# Patient Record
Sex: FEMALE | Race: BLACK OR AFRICAN AMERICAN | Employment: UNEMPLOYED | URBAN - METROPOLITAN AREA
[De-identification: names, ages, dates, MRNs, and addresses within clinical notes are randomized per-mention and may not be internally consistent; named-entity substitution may affect disease eponyms.]

---

## 2020-07-12 ENCOUNTER — HOSPITAL ENCOUNTER (EMERGENCY)
Age: 50
Discharge: HOME | End: 2020-07-13
Attending: EMERGENCY MEDICINE
Payer: SELF-PAY

## 2020-07-12 ENCOUNTER — APPOINTMENT (OUTPATIENT)
Dept: GENERAL RADIOLOGY | Age: 50
End: 2020-07-12
Attending: EMERGENCY MEDICINE
Payer: SELF-PAY

## 2020-07-12 VITALS
HEIGHT: 66 IN | BODY MASS INDEX: 37.77 KG/M2 | RESPIRATION RATE: 20 BRPM | SYSTOLIC BLOOD PRESSURE: 138 MMHG | DIASTOLIC BLOOD PRESSURE: 78 MMHG | TEMPERATURE: 98.6 F | OXYGEN SATURATION: 99 % | HEART RATE: 91 BPM | WEIGHT: 235 LBS

## 2020-07-12 DIAGNOSIS — K29.00 ACUTE GASTRITIS WITHOUT HEMORRHAGE, UNSPECIFIED GASTRITIS TYPE: Primary | ICD-10-CM

## 2020-07-12 DIAGNOSIS — F10.10 ALCOHOL ABUSE: ICD-10-CM

## 2020-07-12 LAB
ALBUMIN SERPL-MCNC: 3.3 G/DL (ref 3.5–5)
ALBUMIN/GLOB SERPL: 0.6 {RATIO} (ref 1.2–3.5)
ALP SERPL-CCNC: 100 U/L (ref 50–136)
ALT SERPL-CCNC: 19 U/L (ref 12–65)
ANION GAP SERPL CALC-SCNC: 10 MMOL/L (ref 7–16)
AST SERPL-CCNC: 23 U/L (ref 15–37)
BASOPHILS # BLD: 0 K/UL (ref 0–0.2)
BASOPHILS NFR BLD: 0 % (ref 0–2)
BILIRUB SERPL-MCNC: 0.7 MG/DL (ref 0.2–1.1)
BUN SERPL-MCNC: 6 MG/DL (ref 6–23)
CALCIUM SERPL-MCNC: 9.2 MG/DL (ref 8.3–10.4)
CHLORIDE SERPL-SCNC: 108 MMOL/L (ref 98–107)
CO2 SERPL-SCNC: 24 MMOL/L (ref 21–32)
CREAT SERPL-MCNC: 0.86 MG/DL (ref 0.6–1)
DIFFERENTIAL METHOD BLD: ABNORMAL
EOSINOPHIL # BLD: 0.1 K/UL (ref 0–0.8)
EOSINOPHIL NFR BLD: 0 % (ref 0.5–7.8)
ERYTHROCYTE [DISTWIDTH] IN BLOOD BY AUTOMATED COUNT: 12.3 % (ref 11.9–14.6)
ETHANOL SERPL-MCNC: <3 MG/DL
GLOBULIN SER CALC-MCNC: 5.7 G/DL (ref 2.3–3.5)
GLUCOSE SERPL-MCNC: 200 MG/DL (ref 65–100)
HCT VFR BLD AUTO: 50.5 % (ref 35.8–46.3)
HGB BLD-MCNC: 16.1 G/DL (ref 11.7–15.4)
IMM GRANULOCYTES # BLD AUTO: 0.1 K/UL (ref 0–0.5)
IMM GRANULOCYTES NFR BLD AUTO: 0 % (ref 0–5)
LIPASE SERPL-CCNC: 55 U/L (ref 73–393)
LYMPHOCYTES # BLD: 2.6 K/UL (ref 0.5–4.6)
LYMPHOCYTES NFR BLD: 21 % (ref 13–44)
MCH RBC QN AUTO: 29.4 PG (ref 26.1–32.9)
MCHC RBC AUTO-ENTMCNC: 31.9 G/DL (ref 31.4–35)
MCV RBC AUTO: 92.2 FL (ref 79.6–97.8)
MONOCYTES # BLD: 0.5 K/UL (ref 0.1–1.3)
MONOCYTES NFR BLD: 4 % (ref 4–12)
NEUTS SEG # BLD: 9.2 K/UL (ref 1.7–8.2)
NEUTS SEG NFR BLD: 74 % (ref 43–78)
NRBC # BLD: 0 K/UL (ref 0–0.2)
PLATELET # BLD AUTO: 347 K/UL (ref 150–450)
PMV BLD AUTO: 10.8 FL (ref 9.4–12.3)
POTASSIUM SERPL-SCNC: 4 MMOL/L (ref 3.5–5.1)
PROT SERPL-MCNC: 9 G/DL (ref 6.3–8.2)
RBC # BLD AUTO: 5.48 M/UL (ref 4.05–5.2)
SODIUM SERPL-SCNC: 142 MMOL/L (ref 136–145)
TROPONIN-HIGH SENSITIVITY: 16.1 PG/ML (ref 0–14)
WBC # BLD AUTO: 12.4 K/UL (ref 4.3–11.1)

## 2020-07-12 PROCEDURE — 83690 ASSAY OF LIPASE: CPT

## 2020-07-12 PROCEDURE — 80053 COMPREHEN METABOLIC PANEL: CPT

## 2020-07-12 PROCEDURE — 74022 RADEX COMPL AQT ABD SERIES: CPT

## 2020-07-12 PROCEDURE — 96375 TX/PRO/DX INJ NEW DRUG ADDON: CPT

## 2020-07-12 PROCEDURE — 85025 COMPLETE CBC W/AUTO DIFF WBC: CPT

## 2020-07-12 PROCEDURE — 93005 ELECTROCARDIOGRAM TRACING: CPT | Performed by: EMERGENCY MEDICINE

## 2020-07-12 PROCEDURE — 74011000250 HC RX REV CODE- 250: Performed by: EMERGENCY MEDICINE

## 2020-07-12 PROCEDURE — 74011250636 HC RX REV CODE- 250/636: Performed by: EMERGENCY MEDICINE

## 2020-07-12 PROCEDURE — 74011250637 HC RX REV CODE- 250/637: Performed by: EMERGENCY MEDICINE

## 2020-07-12 PROCEDURE — 96374 THER/PROPH/DIAG INJ IV PUSH: CPT

## 2020-07-12 PROCEDURE — 84484 ASSAY OF TROPONIN QUANT: CPT

## 2020-07-12 PROCEDURE — 99284 EMERGENCY DEPT VISIT MOD MDM: CPT

## 2020-07-12 PROCEDURE — 80307 DRUG TEST PRSMV CHEM ANLYZR: CPT

## 2020-07-12 RX ORDER — MAG HYDROX/ALUMINUM HYD/SIMETH 200-200-20
30 SUSPENSION, ORAL (FINAL DOSE FORM) ORAL
Status: COMPLETED | OUTPATIENT
Start: 2020-07-12 | End: 2020-07-12

## 2020-07-12 RX ORDER — LIDOCAINE HYDROCHLORIDE 20 MG/ML
10 SOLUTION OROPHARYNGEAL ONCE
Status: COMPLETED | OUTPATIENT
Start: 2020-07-12 | End: 2020-07-12

## 2020-07-12 RX ORDER — ONDANSETRON 2 MG/ML
4 INJECTION INTRAMUSCULAR; INTRAVENOUS
Status: COMPLETED | OUTPATIENT
Start: 2020-07-12 | End: 2020-07-12

## 2020-07-12 RX ORDER — OMEPRAZOLE 40 MG/1
40 CAPSULE, DELAYED RELEASE ORAL DAILY
Qty: 30 CAP | Refills: 0 | Status: SHIPPED | OUTPATIENT
Start: 2020-07-12

## 2020-07-12 RX ORDER — RANITIDINE 150 MG/1
150 TABLET, FILM COATED ORAL 2 TIMES DAILY
Qty: 60 TAB | Refills: 0 | Status: SHIPPED | OUTPATIENT
Start: 2020-07-12

## 2020-07-12 RX ORDER — PROMETHAZINE HYDROCHLORIDE 25 MG/1
25 TABLET ORAL
Qty: 12 TAB | Refills: 0 | Status: SHIPPED | OUTPATIENT
Start: 2020-07-12

## 2020-07-12 RX ORDER — BUTORPHANOL TARTRATE 2 MG/ML
2 INJECTION INTRAMUSCULAR; INTRAVENOUS
Status: COMPLETED | OUTPATIENT
Start: 2020-07-12 | End: 2020-07-12

## 2020-07-12 RX ADMIN — LIDOCAINE HYDROCHLORIDE 10 ML: 20 SOLUTION ORAL; TOPICAL at 22:24

## 2020-07-12 RX ADMIN — ALUMINUM HYDROXIDE, MAGNESIUM HYDROXIDE, AND SIMETHICONE 30 ML: 200; 200; 20 SUSPENSION ORAL at 22:24

## 2020-07-12 RX ADMIN — BUTORPHANOL TARTRATE 2 MG: 2 INJECTION, SOLUTION INTRAMUSCULAR; INTRAVENOUS at 22:24

## 2020-07-12 RX ADMIN — ONDANSETRON 4 MG: 2 INJECTION INTRAMUSCULAR; INTRAVENOUS at 22:24

## 2020-07-12 RX ADMIN — FAMOTIDINE 20 MG: 10 INJECTION, SOLUTION INTRAVENOUS at 22:24

## 2020-07-12 RX ADMIN — SODIUM CHLORIDE 1000 ML: 9 INJECTION, SOLUTION INTRAVENOUS at 22:27

## 2020-07-13 LAB
ATRIAL RATE: 109 BPM
CALCULATED P AXIS, ECG09: 50 DEGREES
CALCULATED R AXIS, ECG10: 20 DEGREES
CALCULATED T AXIS, ECG11: 63 DEGREES
DIAGNOSIS, 93000: NORMAL
P-R INTERVAL, ECG05: 124 MS
Q-T INTERVAL, ECG07: 334 MS
QRS DURATION, ECG06: 86 MS
QTC CALCULATION (BEZET), ECG08: 449 MS
VENTRICULAR RATE, ECG03: 109 BPM

## 2020-07-13 NOTE — ED TRIAGE NOTES
Pt laying in lobby floor. Pt walked to triage without difficulty. Mask in place. Pt reports vomiting \"blood and stomach acid\". Reports decreased appetite, cp starting this morning and sob. States this has been going on for several days but the cp started this morning and worsened throughout the day. Pt rolling around in triage chair stating \"help me oh god help me\".

## 2020-07-13 NOTE — ED NOTES
I have reviewed discharge instructions with the patient. The patient verbalized understanding. Patient left ED via Discharge Method: ambulatory to Home with self    Opportunity for questions and clarification provided. Patient given 3 scripts. To continue your aftercare when you leave the hospital, you may receive an automated call from our care team to check in on how you are doing. This is a free service and part of our promise to provide the best care and service to meet your aftercare needs.  If you have questions, or wish to unsubscribe from this service please call [phone]. Thank you for Choosing our [facility] [employer] [facility] Emergency Department.

## 2020-07-13 NOTE — ED PROVIDER NOTES
51-year-old smoker with a history of alcohol abuse and peptic ulcer disease presents with severe epigastric pain radiating to her chest for the past few days. Pain worsened today. She admits to recently getting out of alf and drinking alcohol heavily since her discharge. She has a history of a \"bleeding ulcer\" and has been taking Carafate without improvement. She has had vomiting with blood, no diarrhea. No documented fevers. She has some shortness of breath associated with the pain. No cough or congestion. Chest Pain (Angina)    Associated symptoms include abdominal pain, nausea and vomiting. Pertinent negatives include no back pain, no cough, no fever, no headaches, no palpitations, no shortness of breath and no weakness. Past Medical History:   Diagnosis Date    [facility])        Past Surgical History:   Procedure Laterality Date    HX ORTHOPAEDIC      LEFT ANKLE SURGERY         No family history on file. Social History     Socioeconomic History    Marital status: SINGLE     Spouse name: Not on file    Number of children: Not on file    Years of education: Not on file    Highest education level: Not on file   Occupational History    Not on file   Social Needs    Financial resource strain: Not on file    Food insecurity     Worry: Not on file     Inability: Not on file    Transportation needs     Medical: Not on file     Non-medical: Not on file   Tobacco Use    Smoking status: Light Tobacco Smoker     Packs/day: 0.25   Substance and Sexual Activity    Alcohol use:  Yes     Alcohol/week: 0.0 standard drinks     Types: 7 - 12 Cans of beer per week    Drug use: Yes     Types: Cocaine    Sexual activity: Not on file   Lifestyle    Physical activity     Days per week: Not on file     Minutes per session: Not on file    Stress: Not on file   Relationships    Social connections     Talks on phone: Not on file     Gets together: Not on file     Attends Evangelical service: Not on file     Active member of club or organization: Not on file     Attends meetings of clubs or organizations: Not on file     Relationship status: Not on file    Intimate partner violence     Fear of current or ex partner: Not on file     Emotionally abused: Not on file     Physically abused: Not on file     Forced sexual activity: Not on file   Other Topics Concern    Not on file   Social History Narrative    Not on file         ALLERGIES: Bee pollen    Review of Systems   Constitutional: Negative for chills and fever. HENT: Negative for hearing loss. Eyes: Negative for visual disturbance. Respiratory: Negative for cough and shortness of breath. Cardiovascular: Positive for chest pain. Negative for palpitations. Gastrointestinal: Positive for abdominal pain, nausea and vomiting. Negative for diarrhea. Genitourinary: Negative for dysuria. Musculoskeletal: Negative for back pain. Skin: Negative for rash. Neurological: Negative for weakness and headaches. Psychiatric/Behavioral: Negative for confusion. Vitals:    07/12/20 2116   Pulse: (!) 119   Resp: 26   Temp: 98.5 °F (36.9 °C)   SpO2: 96%   Weight: 106.6 kg (235 lb)   Height: 5' 6\" (1.676 m)            Physical Exam  Vitals signs and nursing note reviewed. Constitutional:       Appearance: She is well-developed. She is ill-appearing. HENT:      Head: Normocephalic and atraumatic. Eyes:      Pupils: Pupils are equal, round, and reactive to light. Neck:      Musculoskeletal: Normal range of motion and neck supple. Cardiovascular:      Rate and Rhythm: Regular rhythm. Tachycardia present. Heart sounds: Normal heart sounds. Pulmonary:      Effort: Pulmonary effort is normal.      Breath sounds: Normal breath sounds. Abdominal:      Palpations: Abdomen is soft. Tenderness: There is abdominal tenderness in the right upper quadrant, epigastric area and left upper quadrant. There is guarding. Musculoskeletal: Normal range of motion. Skin:     General: Skin is warm and dry. Neurological:      Mental Status: She is alert. Psychiatric:         Behavior: Behavior normal.          MDM  Number of Diagnoses or Management Options  Diagnosis management comments: Parts of this document were created using dragon voice recognition software. The chart has been reviewed but errors may still be present. I wore appropriate PPE throughout this patient's ED visit. Martha Sylvester MD, 10:16 PM    11:37 PM  No suggestion of bleeding or perforated ulcer by labs or x-ray. Pain improved. Advised to stop drinking. I discussed the results of all labs, procedures, radiographs, and treatments with the patient and available family. Treatment plan is agreed upon and the patient is ready for discharge. Questions about treatment in the ED and differential diagnosis of presenting condition were answered. Patient was given verbal discharge instructions including, but not limited to, importance of returning to the emergency department for any concern of worsening or continued symptoms. Instructions were given to follow up with a primary care provider or specialist within 1-2 days. Adverse effects of medications, if prescribed, were discussed and patient was advised to refrain from significant physical activity until followed up by primary care physician and to not drive or operate heavy machinery after taking any sedating substances.                Amount and/or Complexity of Data Reviewed  Clinical lab tests: reviewed and ordered (Results for orders placed or performed during the hospital encounter of 07/12/20  -CBC WITH AUTOMATED DIFF       Result                      Value             Ref Range           WBC                         12.4 (H)          4.3 - 11.1 K*       RBC                         5.48 (H)          4.05 - 5.2 M*       HGB                         16.1 (H)          11.7 - 15.4 *       HCT 50. 5 (H)          35.8 - 46.3 %       MCV                         92.2              79.6 - 97.8 *       MCH                         29.4              26.1 - 32.9 *       MCHC                        31.9              31.4 - 35.0 *       RDW                         12.3              11.9 - 14.6 %       PLATELET                    347               150 - 450 K/*       MPV                         10.8              9.4 - 12.3 FL       ABSOLUTE NRBC               0.00              0.0 - 0.2 K/*       DF                          AUTOMATED                             NEUTROPHILS                 74                43 - 78 %           LYMPHOCYTES                 21                13 - 44 %           MONOCYTES                   4                 4.0 - 12.0 %        EOSINOPHILS                 0 (L)             0.5 - 7.8 %         BASOPHILS                   0                 0.0 - 2.0 %         IMMATURE GRANULOCYTES       0                 0.0 - 5.0 %         ABS. NEUTROPHILS            9.2 (H)           1.7 - 8.2 K/*       ABS. LYMPHOCYTES            2.6               0.5 - 4.6 K/*       ABS. MONOCYTES              0.5               0.1 - 1.3 K/*       ABS. EOSINOPHILS            0.1               0.0 - 0.8 K/*       ABS. BASOPHILS              0.0               0.0 - 0.2 K/*       ABS. IMM.  GRANS.            0.1               0.0 - 0.5 K/*  -METABOLIC PANEL, COMPREHENSIVE       Result                      Value             Ref Range           Sodium                      142               136 - 145 mm*       Potassium                   4.0               3.5 - 5.1 mm*       Chloride                    108 (H)           98 - 107 mmo*       CO2                         24                21 - 32 mmol*       Anion gap                   10                7 - 16 mmol/L       Glucose                     200 (H)           65 - 100 mg/*       BUN                         6                 6 - 23 MG/DL        Creatinine 0.86              0.6 - 1.0 MG*       GFR est AA                  >60               >60 ml/min/1*       GFR est non-AA              >60               >60 ml/min/1*       Calcium                     9.2               8.3 - 10.4 M*       Bilirubin, total            0.7               0.2 - 1.1 MG*       ALT (SGPT)                  19                12 - 65 U/L         AST (SGOT)                  23                15 - 37 U/L         Alk. phosphatase            100               50 - 136 U/L        Protein, total              9.0 (H)           6.3 - 8.2 g/*       Albumin                     3.3 (L)           3.5 - 5.0 g/*       Globulin                    5.7 (H)           2.3 - 3.5 g/*       A-G Ratio                   0.6 (L)           1.2 - 3.5      -TROPONIN-HIGH SENSITIVITY       Result                      Value             Ref Range           Troponin-High Sensitiv*     16.1 (H)          0 - 14 pg/mL   -LIPASE       Result                      Value             Ref Range           Lipase                      55 (L)            73 - 393 U/L   -ETHYL ALCOHOL       Result                      Value             Ref Range           ALCOHOL(ETHYL),SERUM        <3                MG/DL          )  Tests in the radiology section of CPT®: ordered and reviewed (Xr Abd Acute W 1 V Chest    Result Date: 7/12/2020  EXAM: Acute abdomen series. INDICATION: Pain. COMPARISON: Prior chest x-ray on March 2, 2016. TECHNIQUE: Supine and upright views of the abdomen were supplemented with a frontal view of the chest. FINDINGS: The heart and lungs are normal. No pneumothorax or pleural effusion. There is mild to moderate constipation. No bowel obstruction or free air. IMPRESSION: 1. No acute intrathoracic process.  2. Constipation.    )  Tests in the medicine section of CPT®: reviewed and ordered           Procedures

## 2020-07-13 NOTE — DISCHARGE INSTRUCTIONS
Stop drinking. Avoid anti-inflammatory pain medications, aspirin, and BC powders. Return for worsening or concerning symptoms. Follow-up with a primary care physician and GI.

## 2020-07-14 ENCOUNTER — HOSPITAL ENCOUNTER (EMERGENCY)
Age: 50
Discharge: HOME | End: 2020-07-14
Attending: EMERGENCY MEDICINE
Payer: SELF-PAY

## 2020-07-14 ENCOUNTER — APPOINTMENT (OUTPATIENT)
Dept: GENERAL RADIOLOGY | Age: 50
End: 2020-07-14
Attending: EMERGENCY MEDICINE
Payer: SELF-PAY

## 2020-07-14 VITALS
HEIGHT: 66 IN | BODY MASS INDEX: 36.96 KG/M2 | TEMPERATURE: 98 F | HEART RATE: 101 BPM | SYSTOLIC BLOOD PRESSURE: 172 MMHG | RESPIRATION RATE: 22 BRPM | WEIGHT: 230 LBS | DIASTOLIC BLOOD PRESSURE: 87 MMHG | OXYGEN SATURATION: 97 %

## 2020-07-14 DIAGNOSIS — R11.2 NAUSEA AND VOMITING, INTRACTABILITY OF VOMITING NOT SPECIFIED, UNSPECIFIED VOMITING TYPE: ICD-10-CM

## 2020-07-14 DIAGNOSIS — R10.13 ABDOMINAL PAIN, EPIGASTRIC: Primary | ICD-10-CM

## 2020-07-14 LAB
ALBUMIN SERPL-MCNC: 3.4 G/DL (ref 3.5–5)
ALBUMIN/GLOB SERPL: 0.7 {RATIO} (ref 1.2–3.5)
ALP SERPL-CCNC: 89 U/L (ref 50–136)
ALT SERPL-CCNC: 17 U/L (ref 12–65)
ANION GAP SERPL CALC-SCNC: 6 MMOL/L (ref 7–16)
AST SERPL-CCNC: 17 U/L (ref 15–37)
ATRIAL RATE: 105 BPM
BASOPHILS # BLD: 0 K/UL (ref 0–0.2)
BASOPHILS NFR BLD: 0 % (ref 0–2)
BILIRUB SERPL-MCNC: 0.6 MG/DL (ref 0.2–1.1)
BUN SERPL-MCNC: 6 MG/DL (ref 6–23)
CALCIUM SERPL-MCNC: 9.1 MG/DL (ref 8.3–10.4)
CALCULATED P AXIS, ECG09: 76 DEGREES
CALCULATED R AXIS, ECG10: 40 DEGREES
CALCULATED T AXIS, ECG11: 78 DEGREES
CHLORIDE SERPL-SCNC: 107 MMOL/L (ref 98–107)
CO2 SERPL-SCNC: 26 MMOL/L (ref 21–32)
CREAT SERPL-MCNC: 0.8 MG/DL (ref 0.6–1)
DIAGNOSIS, 93000: NORMAL
DIFFERENTIAL METHOD BLD: ABNORMAL
EOSINOPHIL # BLD: 0.1 K/UL (ref 0–0.8)
EOSINOPHIL NFR BLD: 1 % (ref 0.5–7.8)
ERYTHROCYTE [DISTWIDTH] IN BLOOD BY AUTOMATED COUNT: 12.2 % (ref 11.9–14.6)
GLOBULIN SER CALC-MCNC: 5 G/DL (ref 2.3–3.5)
GLUCOSE SERPL-MCNC: 213 MG/DL (ref 65–100)
HCT VFR BLD AUTO: 47.7 % (ref 35.8–46.3)
HGB BLD-MCNC: 15.4 G/DL (ref 11.7–15.4)
IMM GRANULOCYTES # BLD AUTO: 0 K/UL (ref 0–0.5)
IMM GRANULOCYTES NFR BLD AUTO: 0 % (ref 0–5)
LYMPHOCYTES # BLD: 2.7 K/UL (ref 0.5–4.6)
LYMPHOCYTES NFR BLD: 23 % (ref 13–44)
MCH RBC QN AUTO: 29.8 PG (ref 26.1–32.9)
MCHC RBC AUTO-ENTMCNC: 32.3 G/DL (ref 31.4–35)
MCV RBC AUTO: 92.4 FL (ref 79.6–97.8)
MONOCYTES # BLD: 0.5 K/UL (ref 0.1–1.3)
MONOCYTES NFR BLD: 4 % (ref 4–12)
NEUTS SEG # BLD: 8.5 K/UL (ref 1.7–8.2)
NEUTS SEG NFR BLD: 72 % (ref 43–78)
NRBC # BLD: 0 K/UL (ref 0–0.2)
P-R INTERVAL, ECG05: 122 MS
PLATELET # BLD AUTO: 321 K/UL (ref 150–450)
PMV BLD AUTO: 10.7 FL (ref 9.4–12.3)
POTASSIUM SERPL-SCNC: 3.6 MMOL/L (ref 3.5–5.1)
PROT SERPL-MCNC: 8.4 G/DL (ref 6.3–8.2)
Q-T INTERVAL, ECG07: 346 MS
QRS DURATION, ECG06: 86 MS
QTC CALCULATION (BEZET), ECG08: 457 MS
RBC # BLD AUTO: 5.16 M/UL (ref 4.05–5.2)
SODIUM SERPL-SCNC: 139 MMOL/L (ref 136–145)
TROPONIN-HIGH SENSITIVITY: 17.7 PG/ML (ref 0–14)
VENTRICULAR RATE, ECG03: 105 BPM
WBC # BLD AUTO: 11.8 K/UL (ref 4.3–11.1)

## 2020-07-14 PROCEDURE — 71045 X-RAY EXAM CHEST 1 VIEW: CPT

## 2020-07-14 PROCEDURE — 99284 EMERGENCY DEPT VISIT MOD MDM: CPT

## 2020-07-14 PROCEDURE — 93005 ELECTROCARDIOGRAM TRACING: CPT | Performed by: EMERGENCY MEDICINE

## 2020-07-14 PROCEDURE — 84484 ASSAY OF TROPONIN QUANT: CPT

## 2020-07-14 PROCEDURE — 80053 COMPREHEN METABOLIC PANEL: CPT

## 2020-07-14 PROCEDURE — 74011000250 HC RX REV CODE- 250: Performed by: EMERGENCY MEDICINE

## 2020-07-14 PROCEDURE — 96374 THER/PROPH/DIAG INJ IV PUSH: CPT

## 2020-07-14 PROCEDURE — 74011250636 HC RX REV CODE- 250/636: Performed by: EMERGENCY MEDICINE

## 2020-07-14 PROCEDURE — 85025 COMPLETE CBC W/AUTO DIFF WBC: CPT

## 2020-07-14 PROCEDURE — 96375 TX/PRO/DX INJ NEW DRUG ADDON: CPT

## 2020-07-14 PROCEDURE — 74011250637 HC RX REV CODE- 250/637: Performed by: EMERGENCY MEDICINE

## 2020-07-14 RX ORDER — MAG HYDROX/ALUMINUM HYD/SIMETH 200-200-20
30 SUSPENSION, ORAL (FINAL DOSE FORM) ORAL
Status: COMPLETED | OUTPATIENT
Start: 2020-07-14 | End: 2020-07-14

## 2020-07-14 RX ORDER — LIDOCAINE HYDROCHLORIDE 20 MG/ML
15 SOLUTION OROPHARYNGEAL
Status: COMPLETED | OUTPATIENT
Start: 2020-07-14 | End: 2020-07-14

## 2020-07-14 RX ORDER — PROMETHAZINE HYDROCHLORIDE 25 MG/1
25 SUPPOSITORY RECTAL
Qty: 10 SUPPOSITORY | Refills: 3 | Status: SHIPPED | OUTPATIENT
Start: 2020-07-14

## 2020-07-14 RX ORDER — LORAZEPAM 2 MG/ML
0.5 INJECTION INTRAMUSCULAR
Status: COMPLETED | OUTPATIENT
Start: 2020-07-14 | End: 2020-07-14

## 2020-07-14 RX ORDER — ONDANSETRON 4 MG/1
4 TABLET, ORALLY DISINTEGRATING ORAL
Qty: 8 TAB | Refills: 2 | Status: SHIPPED | OUTPATIENT
Start: 2020-07-14

## 2020-07-14 RX ORDER — PROMETHAZINE HYDROCHLORIDE 25 MG/1
25 TABLET ORAL
Qty: 15 TAB | Refills: 2 | Status: SHIPPED | OUTPATIENT
Start: 2020-07-14

## 2020-07-14 RX ORDER — HYDROMORPHONE HYDROCHLORIDE 1 MG/ML
1 INJECTION, SOLUTION INTRAMUSCULAR; INTRAVENOUS; SUBCUTANEOUS ONCE
Status: DISCONTINUED | OUTPATIENT
Start: 2020-07-14 | End: 2020-07-14 | Stop reason: HOSPADM

## 2020-07-14 RX ORDER — ONDANSETRON 2 MG/ML
4 INJECTION INTRAMUSCULAR; INTRAVENOUS
Status: COMPLETED | OUTPATIENT
Start: 2020-07-14 | End: 2020-07-14

## 2020-07-14 RX ADMIN — LIDOCAINE HYDROCHLORIDE 15 ML: 20 SOLUTION ORAL; TOPICAL at 16:51

## 2020-07-14 RX ADMIN — ALUMINUM HYDROXIDE, MAGNESIUM HYDROXIDE, AND SIMETHICONE 30 ML: 200; 200; 20 SUSPENSION ORAL at 16:51

## 2020-07-14 RX ADMIN — ONDANSETRON 4 MG: 2 INJECTION INTRAMUSCULAR; INTRAVENOUS at 16:52

## 2020-07-14 RX ADMIN — SODIUM CHLORIDE 1000 ML: 9 INJECTION, SOLUTION INTRAVENOUS at 16:51

## 2020-07-14 RX ADMIN — LORAZEPAM 0.5 MG: 2 INJECTION INTRAMUSCULAR; INTRAVENOUS at 16:52

## 2020-07-14 NOTE — ED TRIAGE NOTES
Pt to triage in wheelchair. Pt reports nausea, shortness of breath, and chest pain x several days. Pt states she was here the other night for it, but it has gotten worse. None

## 2020-07-14 NOTE — ED NOTES
I have reviewed discharge instructions with the patient. The patient verbalized understanding. Patient left ED via Discharge Method: ambulatory to Home with self  Opportunity for questions and clarification provided. Patient given 0 scripts. PT REFUSED TO SIGN D/C PAPERWORK. PT REFUSED D/C VITALS. STATES SHE IS GOING TO SIGN BACK IN UNTIL SHE GETS MORE PAIN MEDICATION. To continue your aftercare when you leave the hospital, you may receive an automated call from our care team to check in on how you are doing. This is a free service and part of our promise to provide the best care and service to meet your aftercare needs.  If you have questions, or wish to unsubscribe from this service please call [phone]. Thank you for Choosing our [facility] Emergency Department.

## 2020-07-14 NOTE — ED PROVIDER NOTES
Patient has a history of gastritis secondary to alcohol abuse who is complaining of epigastric pain rating to her chest for the past week. She describes it as constant sharp pain worse when she tries to eat or drink. She states she vomits anytime she tries to eat or drink anything. She denies any diarrhea. She denies any diaphoresis. She states she was here a couple days ago for the same complaint. She has not had any alcohol to drink since that time. Her symptoms are persisting thus she came here for evaluation. Past Medical History:   Diagnosis Date    [facility], morbid [facility])        Past Surgical History:   Procedure Laterality Date    HX ORTHOPAEDIC      LEFT ANKLE SURGERY         No family history on file. Social History     Socioeconomic History    Marital status: SINGLE     Spouse name: Not on file    Number of children: Not on file    Years of education: Not on file    Highest education level: Not on file   Occupational History    Not on file   Social Needs    Financial resource strain: Not on file    Food insecurity     Worry: Not on file     Inability: Not on file    Transportation needs     Medical: Not on file     Non-medical: Not on file   Tobacco Use    Smoking status: Light Tobacco Smoker     Packs/day: 0.25   Substance and Sexual Activity    Alcohol use:  Yes     Alcohol/week: 0.0 standard drinks     Types: 7 - 12 Cans of beer per week    Drug use: Yes     Types: Cocaine    Sexual activity: Not on file   Lifestyle    Physical activity     Days per week: Not on file     Minutes per session: Not on file    Stress: Not on file   Relationships    Social connections     Talks on phone: Not on file     Gets together: Not on file     Attends Congregational service: Not on file     Active member of club or organization: Not on file     Attends meetings of clubs or organizations: Not on file     Relationship status: Not on file    Intimate partner violence     Fear of current or ex partner: Not on file     Emotionally abused: Not on file     Physically abused: Not on file     Forced sexual activity: Not on file   Other Topics Concern    Not on file   Social History Narrative    Not on file         ALLERGIES: Bee pollen    Review of Systems   Constitutional: Negative for chills and fever. Gastrointestinal: Positive for abdominal pain, nausea and vomiting. All other systems reviewed and are negative. Vitals:    07/14/20 1203 07/14/20 1425   BP: 131/82    Pulse: (!) 113    Resp: 22    Temp: 98 °F (36.7 °C)    SpO2: 96% 98%   Weight: 104.3 kg (230 lb)    Height: 5' 6\" (1.676 m)             Physical Exam  Vitals signs and nursing note reviewed. Constitutional:       Appearance: She is well-developed. She is obese. HENT:      Head: Normocephalic and atraumatic. Eyes:      Conjunctiva/sclera: Conjunctivae normal.      Pupils: Pupils are equal, round, and reactive to light. Neck:      Musculoskeletal: Normal range of motion and neck supple. Cardiovascular:      Rate and Rhythm: Normal rate and regular rhythm. Heart sounds: Normal heart sounds. Pulmonary:      Effort: Pulmonary effort is normal. No respiratory distress. Breath sounds: Normal breath sounds. Abdominal:      General: Bowel sounds are normal.      Palpations: Abdomen is soft. Tenderness: There is abdominal tenderness. Comments: Moderate tenderness to palpation epigastrium as indicated   Musculoskeletal:         General: No tenderness. Right lower leg: No edema. Left lower leg: No edema. Skin:     General: Skin is warm and dry. Neurological:      Mental Status: She is alert and oriented to person, place, and time.    Psychiatric:         Behavior: Behavior normal.          MDM  Number of Diagnoses or Management Options  Abdominal pain, epigastric: new and requires workup  Nausea and vomiting, intractability of vomiting not specified, unspecified vomiting type:   Diagnosis management comments: 5:08 PM Discussed results with pt, unremarkable labs. CP is more epigastric in nature likely gastritis given her history of recent alcohol use. Treating symptomatically, will provide f/u info.        Amount and/or Complexity of Data Reviewed  Clinical lab tests: ordered and reviewed    Risk of Complications, Morbidity, and/or Mortality  Presenting problems: moderate  Diagnostic procedures: moderate  Management options: moderate    Patient Progress  Patient progress: improved         Procedures

## 2020-07-14 NOTE — DISCHARGE INSTRUCTIONS
Follow up with one of the doctors listed. Return to the ER if your symptoms worsen.     [address] [facility] [address]  [phone]    [facility]  [address] [phone]

## 2023-11-04 ENCOUNTER — HOSPITAL ENCOUNTER (EMERGENCY)
Age: 53
Discharge: HOME | End: 2023-11-04
Payer: COMMERCIAL

## 2023-11-04 ENCOUNTER — APPOINTMENT (OUTPATIENT)
Dept: GENERAL RADIOLOGY | Age: 53
End: 2023-11-04
Payer: COMMERCIAL

## 2023-11-04 VITALS
HEIGHT: 66 IN | SYSTOLIC BLOOD PRESSURE: 152 MMHG | HEART RATE: 97 BPM | RESPIRATION RATE: 14 BRPM | OXYGEN SATURATION: 96 % | TEMPERATURE: 98.1 F | BODY MASS INDEX: 47.09 KG/M2 | DIASTOLIC BLOOD PRESSURE: 82 MMHG | WEIGHT: 293 LBS

## 2023-11-04 DIAGNOSIS — M79.672 LEFT FOOT PAIN: Primary | ICD-10-CM

## 2023-11-04 PROCEDURE — 6370000000 HC RX 637 (ALT 250 FOR IP): Performed by: NURSE PRACTITIONER

## 2023-11-04 PROCEDURE — 96372 THER/PROPH/DIAG INJ SC/IM: CPT

## 2023-11-04 PROCEDURE — 73630 X-RAY EXAM OF FOOT: CPT

## 2023-11-04 PROCEDURE — 99284 EMERGENCY DEPT VISIT MOD MDM: CPT

## 2023-11-04 PROCEDURE — 6360000002 HC RX W HCPCS: Performed by: NURSE PRACTITIONER

## 2023-11-04 RX ORDER — KETOROLAC TROMETHAMINE 30 MG/ML
15 INJECTION, SOLUTION INTRAMUSCULAR; INTRAVENOUS ONCE
Status: COMPLETED | OUTPATIENT
Start: 2023-11-04 | End: 2023-11-04

## 2023-11-04 RX ORDER — DICLOFENAC SODIUM 75 MG/1
75 TABLET, DELAYED RELEASE ORAL 2 TIMES DAILY
Qty: 10 TABLET | Refills: 0 | Status: SHIPPED | OUTPATIENT
Start: 2023-11-04 | End: 2023-11-09

## 2023-11-04 RX ORDER — ACETAMINOPHEN 500 MG
1000 TABLET ORAL
Status: COMPLETED | OUTPATIENT
Start: 2023-11-04 | End: 2023-11-04

## 2023-11-04 RX ORDER — MELOXICAM 7.5 MG/1
7.5 TABLET ORAL DAILY
Qty: 7 TABLET | Refills: 0 | Status: SHIPPED | OUTPATIENT
Start: 2023-11-04 | End: 2023-11-04

## 2023-11-04 RX ADMIN — ACETAMINOPHEN 1000 MG: 500 TABLET, FILM COATED ORAL at 18:26

## 2023-11-04 RX ADMIN — KETOROLAC TROMETHAMINE 15 MG: 30 INJECTION, SOLUTION INTRAMUSCULAR; INTRAVENOUS at 18:26

## 2023-11-04 ASSESSMENT — PAIN DESCRIPTION - LOCATION
LOCATION: FOOT

## 2023-11-04 ASSESSMENT — PAIN DESCRIPTION - ORIENTATION
ORIENTATION: LEFT

## 2023-11-04 ASSESSMENT — PAIN - FUNCTIONAL ASSESSMENT: PAIN_FUNCTIONAL_ASSESSMENT: 0-10

## 2023-11-04 ASSESSMENT — PAIN SCALES - GENERAL
PAINLEVEL_OUTOF10: 6
PAINLEVEL_OUTOF10: 10
PAINLEVEL_OUTOF10: 10

## 2023-11-04 NOTE — ED TRIAGE NOTES
Patient a 49 y/o Female reports to the ED with c/c of a fall a while ago. States left foot pain and states that she can't hardly walk on it anymore. States some swelling to her left foot. Also having some heartburn.

## 2023-11-04 NOTE — ED PROVIDER NOTES
Emergency Department Provider Note       PCP: No primary care provider on file. Age: 48 y.o. Sex: female     DISPOSITION     dc    ICD-10-CM    1. Left foot pain  M79.672 [facility] [facility], [facility]TH[facility]IES Kannan JENIFFER Malone Alan; MD ([id number]/[id number])          Medical Decision Making     Complexity of Problems Addressed:  1 or more chronic illnesses with a severe exacerbation or progression. 1 acute problem    Data Reviewed and Analyzed:  I independently ordered and reviewed each unique test.  I reviewed external records: provider visit note from outside specialist.       I interpreted the x-ray foot with no acute fracture noted as read by radiologist.    Discussion of management or test interpretation. 59-year-old female presents the ED with complaint of left foot pain. She is denying to me all other complaint. Denies new injury, illness, fever or chills, concern for foreign body. States she has been on her feet more and walking more lately. Pain has been present and constant for 6+ months. We will treat pain in ED, obtain x-ray. Low clinical suspicion at this time of acute fracture, dislocation, gout flare, septic joint, Lisfranc injury, DVT, vascular emergent process, ACS, PE/DVT, perforated gastric ulcer or other acute emergent process. Patient remains without chest or other pain, or other complaint besides left foot pain. She is ambulatory volition but increased effort. States Toradol improved pain. Radiographs revealed no acute process. Will discharge home with prescription for pain medication, place in boot for comfort, refer to orthopedics. I have discussed the patient's results and plan of care, she verbalized displeasure with prescribed course of medication, will discontinue and try alternate prescription. Given strict return precautions. Patient is well-hydrated appearing, no distress.   Nontoxic-appearing, Injury. Comparison: No prior study is available for comparison. Findings:  No acute fracture or dislocation is identified. There are degenerative changes   of the midfoot, most pronounced at the tarsometatarsal joints. The patient has   undergone prior bimalleolar fixation. No radiopaque foreign body is identified. Impression    Impression:  No acute osseous abnormality of the left foot. Heather Tolbert M.D.   11/4/2023 7:01:00 PM        XR FOOT LEFT (MIN 3 VIEWS)   Final Result   Impression:   No acute osseous abnormality of the left foot. Heather Tolbert M.D.    11/4/2023 7:01:00 PM                        Voice dictation software was used during the making of this note. This software is not perfect and grammatical and other typographical errors may be present. This note has not been completely proofread for errors.      Sylwia Joe, MARK - PAUL  11/04/23 2038

## 2023-11-09 NOTE — CARE COORDINATION
CM received call back from pt that she needs a referral to [address] [place] [address] as her insurance [employer] is not in network with  Ortho practice. CM called [phone] and faxed [phone] pt referral info as requested. Encouraged pt to follow up with practice in a few days if she does not receive a call about an appointment.

## 2024-04-19 ENCOUNTER — HOSPITAL ENCOUNTER (EMERGENCY)
Age: 54
Discharge: HOME | End: 2024-04-19
Attending: EMERGENCY MEDICINE
Payer: COMMERCIAL

## 2024-04-19 VITALS
HEIGHT: 66 IN | WEIGHT: 255 LBS | OXYGEN SATURATION: 100 % | RESPIRATION RATE: 18 BRPM | HEART RATE: 67 BPM | DIASTOLIC BLOOD PRESSURE: 77 MMHG | SYSTOLIC BLOOD PRESSURE: 153 MMHG | TEMPERATURE: 98.1 F | BODY MASS INDEX: 40.98 KG/M2

## 2024-04-19 DIAGNOSIS — R05.8 PRODUCTIVE COUGH: ICD-10-CM

## 2024-04-19 DIAGNOSIS — J40 BRONCHITIS: Primary | ICD-10-CM

## 2024-04-19 DIAGNOSIS — R51.9 GENERALIZED HEADACHE: ICD-10-CM

## 2024-04-19 DIAGNOSIS — R51.9 SINUS HEADACHE: ICD-10-CM

## 2024-04-19 DIAGNOSIS — R09.81 SINUS CONGESTION: ICD-10-CM

## 2024-04-19 LAB
ALBUMIN SERPL-MCNC: 3.5 G/DL (ref 3.5–5)
ALBUMIN/GLOB SERPL: 0.8 (ref 0.4–1.6)
ALP SERPL-CCNC: 65 U/L (ref 50–136)
ALT SERPL-CCNC: 14 U/L (ref 12–65)
ANION GAP SERPL CALC-SCNC: 3 MMOL/L (ref 2–11)
AST SERPL-CCNC: 10 U/L (ref 15–37)
BASOPHILS # BLD: 0 K/UL (ref 0–0.2)
BASOPHILS NFR BLD: 0 % (ref 0–2)
BILIRUB SERPL-MCNC: 0.3 MG/DL (ref 0.2–1.1)
BUN SERPL-MCNC: 8 MG/DL (ref 6–23)
CALCIUM SERPL-MCNC: 9.2 MG/DL (ref 8.3–10.4)
CHLORIDE SERPL-SCNC: 111 MMOL/L (ref 103–113)
CO2 SERPL-SCNC: 27 MMOL/L (ref 21–32)
CREAT SERPL-MCNC: 0.7 MG/DL (ref 0.6–1)
DIFFERENTIAL METHOD BLD: ABNORMAL
EOSINOPHIL # BLD: 0.1 K/UL (ref 0–0.8)
EOSINOPHIL NFR BLD: 1 % (ref 0.5–7.8)
ERYTHROCYTE [DISTWIDTH] IN BLOOD BY AUTOMATED COUNT: 13.4 % (ref 11.9–14.6)
FLUAV RNA SPEC QL NAA+PROBE: NOT DETECTED
FLUBV RNA SPEC QL NAA+PROBE: NOT DETECTED
GLOBULIN SER CALC-MCNC: 4.5 G/DL (ref 2.8–4.5)
GLUCOSE SERPL-MCNC: 103 MG/DL (ref 65–100)
HCT VFR BLD AUTO: 35.3 % (ref 35.8–46.3)
HGB BLD-MCNC: 11.3 G/DL (ref 11.7–15.4)
IMM GRANULOCYTES # BLD AUTO: 0 K/UL (ref 0–0.5)
IMM GRANULOCYTES NFR BLD AUTO: 0 % (ref 0–5)
LYMPHOCYTES # BLD: 2.6 K/UL (ref 0.5–4.6)
LYMPHOCYTES NFR BLD: 23 % (ref 13–44)
MCH RBC QN AUTO: 30 PG (ref 26.1–32.9)
MCHC RBC AUTO-ENTMCNC: 32 G/DL (ref 31.4–35)
MCV RBC AUTO: 93.6 FL (ref 82–102)
MONOCYTES # BLD: 0.6 K/UL (ref 0.1–1.3)
MONOCYTES NFR BLD: 6 % (ref 4–12)
NEUTS SEG # BLD: 7.9 K/UL (ref 1.7–8.2)
NEUTS SEG NFR BLD: 70 % (ref 43–78)
NRBC # BLD: 0 K/UL (ref 0–0.2)
PLATELET # BLD AUTO: 271 K/UL (ref 150–450)
PMV BLD AUTO: 11.1 FL (ref 9.4–12.3)
POTASSIUM SERPL-SCNC: 3.6 MMOL/L (ref 3.5–5.1)
PROT SERPL-MCNC: 8 G/DL (ref 6.3–8.2)
RBC # BLD AUTO: 3.77 M/UL (ref 4.05–5.2)
SARS-COV-2 RDRP RESP QL NAA+PROBE: NOT DETECTED
SODIUM SERPL-SCNC: 141 MMOL/L (ref 136–146)
SOURCE: NORMAL
WBC # BLD AUTO: 11.3 K/UL (ref 4.3–11.1)

## 2024-04-19 PROCEDURE — 99283 EMERGENCY DEPT VISIT LOW MDM: CPT

## 2024-04-19 PROCEDURE — 87635 SARS-COV-2 COVID-19 AMP PRB: CPT

## 2024-04-19 PROCEDURE — 80053 COMPREHEN METABOLIC PANEL: CPT

## 2024-04-19 PROCEDURE — 87502 INFLUENZA DNA AMP PROBE: CPT

## 2024-04-19 PROCEDURE — 85025 COMPLETE CBC W/AUTO DIFF WBC: CPT

## 2024-04-19 RX ORDER — LEVOFLOXACIN 750 MG/1
750 TABLET, FILM COATED ORAL DAILY
Qty: 5 TABLET | Refills: 0 | Status: SHIPPED | OUTPATIENT
Start: 2024-04-19 | End: 2024-04-24

## 2024-04-19 RX ORDER — ALBUTEROL SULFATE 90 UG/1
2 AEROSOL, METERED RESPIRATORY (INHALATION) EVERY 6 HOURS PRN
Qty: 1 EACH | Refills: 1 | Status: SHIPPED | OUTPATIENT
Start: 2024-04-19

## 2024-04-19 RX ORDER — AZELASTINE 1 MG/ML
1 SPRAY, METERED NASAL 2 TIMES DAILY
Qty: 30 ML | Refills: 0 | Status: SHIPPED | OUTPATIENT
Start: 2024-04-19 | End: 2024-05-03

## 2024-04-19 ASSESSMENT — PAIN - FUNCTIONAL ASSESSMENT: PAIN_FUNCTIONAL_ASSESSMENT: 0-10

## 2024-04-19 ASSESSMENT — PAIN DESCRIPTION - LOCATION: LOCATION: HEAD

## 2024-04-19 ASSESSMENT — PAIN SCALES - GENERAL: PAINLEVEL_OUTOF10: 10

## 2024-04-19 NOTE — ED PROVIDER NOTES
Emergency Department Provider Note                   PCP:                No primary care provider on file.               Age: 54 y.o.      Sex: female   Final diagnosis/impression:  1. Bronchitis    2. Sinus congestion    3. Sinus headache    4. Generalized headache    5. Productive cough       Disposition: Discharged    MDM/Clinical Course:  Patient seen by myself at the [facility] emergency department. Patient had signs symptoms and clinical history most consistent with worsening cough/bronchitis type symptoms as well as sinus congestion. My independent analysis/interpretation of laboratory work-up here shows CBC shows minimal leukocytosis at 11.3 but is otherwise unremarkable for acute/emergent abnormality, CMP is unremarkable, COVID and flu testing are negative.  Patient declining pregnancy testing here in the emergency department.  Outpatient prescriptions for albuterol inhaler, azelastine spray, Levaquin written.  Recommended follow-up with a primary care provider, close monitoring of symptoms, return as needed.  Patient/family given instructions to return as needed for any questions, concerns or worsening symptoms, particularly those as outlined in the disposition section / discharge section of patient discharge paperwork. Patient/family verbalizes understanding and agreement with ED course/plan in shared medical decision making. Questions answered.    Did discuss with patient that medications given during visit / discharge prescriptions would not nessarily be those selected if patient were pregnant and such medications could potentially cause harm to a developing fetus or pregnancy. Patient is declining pregnancy testing during visit which is reasonable and within her choice as patient here in the emergency department.  Patient does not feel she could be pregnant.    Complexity of Problems Addressed:  1 or more acute illnesses that pose a threat to life or bodily function.     Data Reviewed and  increased work of breathing, no accessory muscle use  Cardiovascular: Regular rate and rhythm, no rub or gallop appreciated on my exam  GI: Abdomen is soft, nontender, nondistended  Musculoskeletal: No obvious joint deformity or joint effusion, normal joint range of motion  Neuro: Cranial nerves II through VII grossly intact, strength and sensation is grossly intact in the upper and lower extremities bilaterally  Skin: Skin is warm and dry    Procedures  No results found for any visits on 04/19/24.   No orders to display                   Voice dictation software was used during the making of this note.  This software is not perfect and grammatical and other typographical errors may be present.  This note has not been completely proofread for errors.

## 2024-04-19 NOTE — ED TRIAGE NOTES
Patient reports of headaches, cough, congestion, for last few days. Runny nose with blood clots. No fever, no chills[place] of feeling fine until then  Hx: sinus infection

## 2024-04-19 NOTE — ED NOTES
Patient mobility status  with no difficulty. Provider aware     I have reviewed discharge instructions with the patient.  The patient verbalized understanding.    Patient left ED via Discharge Method: ambulatory to Home with  self .    Opportunity for questions and clarification provided.     Patient given 3 scripts.           Ophelia Wood LPN  04/19/24 2841

## 2024-04-19 NOTE — DISCHARGE INSTRUCTIONS
We recommend use of over-the-counter nasal saline spray for your sinus congestion as well as continued use of Mucinex to help clear chest mucus, albuterol inhaler as needed, other provided prescriptions    Please return for any questions, concerns or worsening symptoms, particularly increasing shortness of breath, persistent wheezing, increasing chest pain, passing out episodes.

## 2024-04-19 NOTE — DISCHARGE INSTR - COC
Continuity of Care Form    Patient Name: Whit Monsalve   :  1970  MRN:  [id number]    Admit date:  2024  Discharge date:  ***    Code Status Order: No Order   Advance Directives:     Admitting Physician:  No admitting provider for patient encounter.  PCP: No primary care provider on file.    Discharging Nurse: ***  Discharging Hospital Unit/Room#: [id number]/PP01  Discharging Unit Phone Number: ***    Emergency Contact:   Extended Emergency Contact Information  Primary Emergency Contact: Misty Monsalve  Address: [address] [place] [address] [place] [facility]  Home Phone: [phone]  Relation: Other    Past Surgical History:  Past Surgical History:   Procedure Laterality Date    ORTHOPEDIC SURGERY      LEFT ANKLE SURGERY       Immunization History:   Immunization History   Administered Date(s) Administered    COVID-19, MODERNA BLUE border, Primary or Immunocompromised, (age 12y+), IM, 100 mcg/0.5mL 2022, 2022       Active Problems:  There is no problem list on file for this patient.      Isolation/Infection:   Isolation            No Isolation          Patient Infection Status       None to display                     Nurse Assessment:  Last Vital Signs: BP (!) 146/75   Pulse 76   Temp 98.1 °F (36.7 °C)   Resp 18   Ht 1.676 m (5' 6\")   Wt 115.7 kg (255 lb)   SpO2 96%   BMI 41.16 kg/m²     Last documented pain score (0-10 scale): Pain Level: 10  Last Weight:   Wt Readings from Last 1 Encounters:   24 115.7 kg (255 lb)     Mental Status:  {IP PT MENTAL STATUS:}    IV Access:  { JENNIFER IV ACCESS:115832608}    Nursing Mobility/ADLs:  Walking   {CHP DME ADLs:851548423}  Transfer  {CHP DME ADLs:627115789}  Bathing  {CHP DME ADLs:526918267}  Dressing  {CHP DME ADLs:228803858}  Toileting  {CHP DME ADLs:393080524}  Feeding  {CHP DME ADLs:404014557}  Med Admin  {CHP DME ADLs:165084667}  Med Delivery   { JENNIFER MED Delivery:987494919}    Wound